# Patient Record
Sex: FEMALE | Race: WHITE | Employment: OTHER | URBAN - METROPOLITAN AREA
[De-identification: names, ages, dates, MRNs, and addresses within clinical notes are randomized per-mention and may not be internally consistent; named-entity substitution may affect disease eponyms.]

---

## 2018-07-10 PROBLEM — C50.912 HORMONE RECEPTOR POSITIVE MALIGNANT NEOPLASM OF LEFT BREAST (HCC): Status: ACTIVE | Noted: 2018-07-10

## 2018-07-10 PROBLEM — Z00.00 HEALTH CARE MAINTENANCE: Status: ACTIVE | Noted: 2018-07-10

## 2018-07-10 PROBLEM — I10 ESSENTIAL HYPERTENSION: Status: ACTIVE | Noted: 2018-07-10

## 2018-07-11 PROCEDURE — 84244 ASSAY OF RENIN: CPT | Performed by: INTERNAL MEDICINE

## 2018-07-11 PROCEDURE — 82088 ASSAY OF ALDOSTERONE: CPT | Performed by: INTERNAL MEDICINE

## 2021-02-11 PROBLEM — Z90.12 HISTORY OF LEFT MASTECTOMY: Status: ACTIVE | Noted: 2021-02-11

## 2021-05-13 PROBLEM — E78.00 PURE HYPERCHOLESTEROLEMIA: Status: ACTIVE | Noted: 2021-05-13

## 2024-11-23 ENCOUNTER — HOSPITAL ENCOUNTER (INPATIENT)
Facility: HOSPITAL | Age: 69
LOS: 2 days | Discharge: HOME | End: 2024-11-25
Attending: EMERGENCY MEDICINE | Admitting: HOSPITALIST
Payer: MEDICARE

## 2024-11-23 ENCOUNTER — APPOINTMENT (OUTPATIENT)
Dept: GENERAL RADIOLOGY | Facility: HOSPITAL | Age: 69
End: 2024-11-23
Payer: MEDICARE

## 2024-11-23 DIAGNOSIS — R07.9 ACUTE CHEST PAIN: Primary | ICD-10-CM

## 2024-11-23 LAB
ALBUMIN SERPL-MCNC: 4.5 G/DL (ref 3.2–4.8)
ALBUMIN/GLOB SERPL: 1.6 {RATIO} (ref 1–2)
ALP LIVER SERPL-CCNC: 96 U/L
ALT SERPL-CCNC: 20 U/L
ANION GAP SERPL CALC-SCNC: 5 MMOL/L (ref 0–18)
APTT PPP: 24.1 SECONDS (ref 23–36)
AST SERPL-CCNC: 19 U/L (ref ?–34)
BASOPHILS # BLD AUTO: 0.07 X10(3) UL (ref 0–0.2)
BASOPHILS NFR BLD AUTO: 0.8 %
BILIRUB SERPL-MCNC: 0.6 MG/DL (ref 0.2–1.1)
BUN BLD-MCNC: 17 MG/DL (ref 9–23)
CALCIUM BLD-MCNC: 9.9 MG/DL (ref 8.7–10.4)
CHLORIDE SERPL-SCNC: 109 MMOL/L (ref 98–112)
CO2 SERPL-SCNC: 28 MMOL/L (ref 21–32)
CREAT BLD-MCNC: 1.07 MG/DL
EGFRCR SERPLBLD CKD-EPI 2021: 57 ML/MIN/1.73M2 (ref 60–?)
EOSINOPHIL # BLD AUTO: 0.17 X10(3) UL (ref 0–0.7)
EOSINOPHIL NFR BLD AUTO: 2 %
ERYTHROCYTE [DISTWIDTH] IN BLOOD BY AUTOMATED COUNT: 14.4 %
GLOBULIN PLAS-MCNC: 2.9 G/DL (ref 2–3.5)
GLUCOSE BLD-MCNC: 109 MG/DL (ref 70–99)
HCT VFR BLD AUTO: 50 %
HGB BLD-MCNC: 16.5 G/DL
IMM GRANULOCYTES # BLD AUTO: 0.02 X10(3) UL (ref 0–1)
IMM GRANULOCYTES NFR BLD: 0.2 %
INR BLD: 0.94 (ref 0.8–1.2)
LYMPHOCYTES # BLD AUTO: 1.46 X10(3) UL (ref 1–4)
LYMPHOCYTES NFR BLD AUTO: 16.9 %
MCH RBC QN AUTO: 29.3 PG (ref 26–34)
MCHC RBC AUTO-ENTMCNC: 33 G/DL (ref 31–37)
MCV RBC AUTO: 88.7 FL
MONOCYTES # BLD AUTO: 0.64 X10(3) UL (ref 0.1–1)
MONOCYTES NFR BLD AUTO: 7.4 %
NEUTROPHILS # BLD AUTO: 6.29 X10 (3) UL (ref 1.5–7.7)
NEUTROPHILS # BLD AUTO: 6.29 X10(3) UL (ref 1.5–7.7)
NEUTROPHILS NFR BLD AUTO: 72.7 %
NT-PROBNP SERPL-MCNC: 36 PG/ML (ref ?–125)
OSMOLALITY SERPL CALC.SUM OF ELEC: 296 MOSM/KG (ref 275–295)
PLATELET # BLD AUTO: 215 10(3)UL (ref 150–450)
POTASSIUM SERPL-SCNC: 3.9 MMOL/L (ref 3.5–5.1)
PROT SERPL-MCNC: 7.4 G/DL (ref 5.7–8.2)
PROTHROMBIN TIME: 12.7 SECONDS (ref 11.6–14.8)
RBC # BLD AUTO: 5.64 X10(6)UL
SODIUM SERPL-SCNC: 142 MMOL/L (ref 136–145)
TROPONIN I SERPL HS-MCNC: 5 NG/L
TROPONIN I SERPL HS-MCNC: 6 NG/L
TROPONIN I SERPL HS-MCNC: 7 NG/L
WBC # BLD AUTO: 8.7 X10(3) UL (ref 4–11)

## 2024-11-23 PROCEDURE — 36415 COLL VENOUS BLD VENIPUNCTURE: CPT

## 2024-11-23 PROCEDURE — 99285 EMERGENCY DEPT VISIT HI MDM: CPT

## 2024-11-23 PROCEDURE — 71045 X-RAY EXAM CHEST 1 VIEW: CPT | Performed by: EMERGENCY MEDICINE

## 2024-11-23 PROCEDURE — 83880 ASSAY OF NATRIURETIC PEPTIDE: CPT | Performed by: EMERGENCY MEDICINE

## 2024-11-23 PROCEDURE — 93005 ELECTROCARDIOGRAM TRACING: CPT

## 2024-11-23 PROCEDURE — 85610 PROTHROMBIN TIME: CPT | Performed by: EMERGENCY MEDICINE

## 2024-11-23 PROCEDURE — 84484 ASSAY OF TROPONIN QUANT: CPT | Performed by: EMERGENCY MEDICINE

## 2024-11-23 PROCEDURE — 93010 ELECTROCARDIOGRAM REPORT: CPT

## 2024-11-23 PROCEDURE — 80053 COMPREHEN METABOLIC PANEL: CPT | Performed by: EMERGENCY MEDICINE

## 2024-11-23 PROCEDURE — 84484 ASSAY OF TROPONIN QUANT: CPT | Performed by: INTERNAL MEDICINE

## 2024-11-23 PROCEDURE — 85730 THROMBOPLASTIN TIME PARTIAL: CPT | Performed by: EMERGENCY MEDICINE

## 2024-11-23 PROCEDURE — 85025 COMPLETE CBC W/AUTO DIFF WBC: CPT | Performed by: EMERGENCY MEDICINE

## 2024-11-23 RX ORDER — LISINOPRIL 40 MG/1
40 TABLET ORAL DAILY
Status: DISCONTINUED | OUTPATIENT
Start: 2024-11-24 | End: 2024-11-25

## 2024-11-23 RX ORDER — SENNOSIDES 8.6 MG
17.2 TABLET ORAL NIGHTLY PRN
Status: DISCONTINUED | OUTPATIENT
Start: 2024-11-23 | End: 2024-11-25

## 2024-11-23 RX ORDER — METOPROLOL TARTRATE 50 MG
50 TABLET ORAL 2 TIMES DAILY
Status: DISCONTINUED | OUTPATIENT
Start: 2024-11-23 | End: 2024-11-25

## 2024-11-23 RX ORDER — NITROGLYCERIN 0.4 MG/1
0.4 TABLET SUBLINGUAL ONCE
Status: COMPLETED | OUTPATIENT
Start: 2024-11-23 | End: 2024-11-23

## 2024-11-23 RX ORDER — ASPIRIN 325 MG
325 TABLET ORAL DAILY
Status: DISCONTINUED | OUTPATIENT
Start: 2024-11-24 | End: 2024-11-25

## 2024-11-23 RX ORDER — ACETAMINOPHEN 500 MG
500 TABLET ORAL EVERY 4 HOURS PRN
Status: DISCONTINUED | OUTPATIENT
Start: 2024-11-23 | End: 2024-11-25

## 2024-11-23 RX ORDER — POLYETHYLENE GLYCOL 3350 17 G/17G
17 POWDER, FOR SOLUTION ORAL DAILY PRN
Status: DISCONTINUED | OUTPATIENT
Start: 2024-11-23 | End: 2024-11-25

## 2024-11-23 RX ORDER — HYDRALAZINE HYDROCHLORIDE 20 MG/ML
10 INJECTION INTRAMUSCULAR; INTRAVENOUS EVERY 6 HOURS PRN
Status: DISCONTINUED | OUTPATIENT
Start: 2024-11-23 | End: 2024-11-25

## 2024-11-23 RX ORDER — SODIUM PHOSPHATE, DIBASIC AND SODIUM PHOSPHATE, MONOBASIC 7; 19 G/230ML; G/230ML
1 ENEMA RECTAL ONCE AS NEEDED
Status: DISCONTINUED | OUTPATIENT
Start: 2024-11-23 | End: 2024-11-25

## 2024-11-23 RX ORDER — ONDANSETRON 2 MG/ML
4 INJECTION INTRAMUSCULAR; INTRAVENOUS EVERY 6 HOURS PRN
Status: DISCONTINUED | OUTPATIENT
Start: 2024-11-23 | End: 2024-11-25

## 2024-11-23 RX ORDER — BISACODYL 10 MG
10 SUPPOSITORY, RECTAL RECTAL
Status: DISCONTINUED | OUTPATIENT
Start: 2024-11-23 | End: 2024-11-25

## 2024-11-23 RX ORDER — ASPIRIN 81 MG/1
324 TABLET, CHEWABLE ORAL ONCE
Status: COMPLETED | OUTPATIENT
Start: 2024-11-23 | End: 2024-11-23

## 2024-11-23 RX ORDER — ENOXAPARIN SODIUM 100 MG/ML
40 INJECTION SUBCUTANEOUS DAILY
Status: DISCONTINUED | OUTPATIENT
Start: 2024-11-23 | End: 2024-11-25

## 2024-11-23 RX ORDER — AMLODIPINE BESYLATE 2.5 MG/1
2.5 TABLET ORAL DAILY
COMMUNITY
End: 2024-11-25

## 2024-11-23 RX ORDER — AMLODIPINE BESYLATE 2.5 MG/1
2.5 TABLET ORAL DAILY
Status: DISCONTINUED | OUTPATIENT
Start: 2024-11-23 | End: 2024-11-24

## 2024-11-23 RX ORDER — SODIUM CHLORIDE 9 MG/ML
INJECTION, SOLUTION INTRAVENOUS CONTINUOUS
Status: DISCONTINUED | OUTPATIENT
Start: 2024-11-23 | End: 2024-11-25

## 2024-11-23 RX ORDER — NITROGLYCERIN 0.4 MG/1
0.4 TABLET SUBLINGUAL EVERY 5 MIN PRN
Status: DISCONTINUED | OUTPATIENT
Start: 2024-11-23 | End: 2024-11-25

## 2024-11-23 RX ORDER — METOCLOPRAMIDE HYDROCHLORIDE 5 MG/ML
5 INJECTION INTRAMUSCULAR; INTRAVENOUS EVERY 8 HOURS PRN
Status: DISCONTINUED | OUTPATIENT
Start: 2024-11-23 | End: 2024-11-25

## 2024-11-23 NOTE — ED INITIAL ASSESSMENT (HPI)
Pt to ED with complaints of chest pain. Pt reports \"it feels like someone is squeezing it.\"   Pt denies any shortness of breath.

## 2024-11-23 NOTE — H&P
[facility]  HOSPITALIST  History and Physical     Yusra Wiley Patient Status:  Emergency    1955 MRN [id number]   [facility] EMERGENCY DEPARTMENT Attending Jacob Allred,    Hosp Day # 0 PCP Nicholas Williamson MD     Chief Complaint:   Chest pain    History of Present Illness: Yusra Wiley is a 69 year old female with PMH significant for breast CA post left mastectomy, HTN who presented to the ED with chest pain that she describes as squeezing in nature.  Says it has been off and on for the last week.  But got much worse today.  Denies any lightheadedness dizziness nausea vomiting.  Some sweating.  No abdominal pain.  Not related to exertion.  Patient's pain improved in the ED with sublingual nitroglycerin.  Chest x-ray negative.  Labs normal.  EKG without acute findings.  Troponin normal.  Cardiology was consulted and patient was admitted for further care and management.    Past Medical History:  Past Medical History:    Breast cancer (HCC)    left mastectomy with no further f/u     Cancer (HCC)    breast    Essential hypertension    High blood pressure        Past Surgical History:   Past Surgical History:   Procedure Laterality Date    Colonoscopy  10/25/2021    Mastectomy left      Other surgical history      Left breast cancer mastecomy        Social History:  reports that she has never smoked. She has never used smokeless tobacco. She reports current drug use. Drug: Cannabis. She reports that she does not drink alcohol.    Family History:   Family History   Problem Relation Age of Onset    Cancer Father         lung    Heart Disorder Mother     Hypertension Mother     Cancer Sister         breast     Breast Cancer Sister 40        dx age early 40's        Allergies: Allergies[1]    Medications:  Medications Ordered Prior to Encounter[2]    Review of Systems:   A comprehensive 14 point review of systems was completed.    Pertinent positives and negatives noted in the  HPI.    Physical Exam:    BP (!) 127/91   Pulse 55   Temp 98 °F (36.7 °C) (Oral)   Resp 18   Ht 5' 6\" (1.676 m)   Wt 170 lb (77.1 kg)   SpO2 97%   BMI 27.44 kg/m²   General: No acute distress. Alert and oriented x 3.  HEENT: Normocephalic atraumatic. Moist mucous membranes. EOM-I. PERRLA. Anicteric.  Neck: No lymphadenopathy. No JVD. No carotid bruits.  Respiratory: Clear to auscultation bilaterally. No wheezes. No rhonchi.  Cardiovascular: S1, S2. Regular rate and rhythm. No murmurs, rubs or gallops. Equal pulses.   Chest and Back: No tenderness or deformity.  Abdomen: Soft, nontender, nondistended.  Positive bowel sounds. No rebound, guarding or organomegaly.  Neurologic: No focal neurological deficits. CNII-XII grossly intact.  Musculoskeletal: Moves all extremities.  Extremities: No edema or cyanosis.  Integument: No rashes or lesions.   Psychiatric: Appropriate mood and affect.      Diagnostic Data:      Labs:  Recent Labs   Lab 11/23/24  1409 11/23/24  1423   WBC 8.7  --    HGB 16.5*  --    MCV 88.7  --    .0  --    INR  --  0.94       Recent Labs   Lab 11/23/24  1409   *   BUN 17   CREATSERUM 1.07*   CA 9.9   ALB 4.5      K 3.9      CO2 28.0   ALKPHO 96   AST 19   ALT 20   BILT 0.6   TP 7.4       Estimated Creatinine Clearance: 47.8 mL/min (A) (based on SCr of 1.07 mg/dL (H)).    Recent Labs   Lab 11/23/24  1423   PTP 12.7   INR 0.94       COVID-19 Lab Results    COVID-19  Lab Results   Component Value Date    COVID19 Not Detected 10/22/2021       Pro-Calcitonin  No results for input(s): \"PCT\" in the last 168 hours.    Cardiac  Recent Labs   Lab 11/23/24  1409   PBNP 36       Creatinine Kinase  No results for input(s): \"CK\" in the last 168 hours.    Inflammatory Markers  No results for input(s): \"CRP\", \"SARAH\", \"LDH\", \"DDIMER\" in the last 168 hours.    Recent Labs   Lab 11/23/24  1409   TROPHS 5       Imaging: Imaging data reviewed in Epic.      ASSESSMENT / PLAN:   Yusra WALKER  Inez is a 69 year old female with PMH significant for breast CA post left mastectomy, HTN who presented to the ED with chest pain that she describes as squeezing in nature.      Chest pain - concerning for angina  -tele  -trend trops  -initial trop and EKG neg  -supportive care  -cardiology consulted >> apprec recs    HTN  -resume home meds: amlodipine, metoprolol, lisinopril    Hx of breast ca, sp mastectomy  -stable, not active    Quality:  DVT Prophylaxis: Lovenox, SCDs  CODE status: Full code  Heredia: No  If COVID testing is negative, may discontinue isolation: Yes    Plan of care discussed with patient and all questions answered.        Romina Heredia MD  [facility] Hospitalist  Pager [phone]  Answering Service number: [phone]                            [1]   Allergies  Allergen Reactions    Hctz [Hydrochlorothiazide] HYPOTENSION    Opioid Analgesics NAUSEA AND VOMITING    Hydromorphone NAUSEA ONLY    Morphine NAUSEA ONLY   [2]   No current facility-administered medications on file prior to encounter.     Current Outpatient Medications on File Prior to Encounter   Medication Sig Dispense Refill    amLODIPine 2.5 MG Oral Tab Take 1 tablet (2.5 mg total) by mouth daily.      LISINOPRIL 40 MG Oral Tab TAKE 1 TABLET(40 MG) BY MOUTH DAILY 90 tablet 0    METOPROLOL TARTRATE 50 MG Oral Tab TAKE 1 TABLET(50 MG) BY MOUTH TWICE DAILY 180 tablet 0

## 2024-11-23 NOTE — ED QUICK NOTES
Pt reports significant pain improvement post medication admin.   Pt BP also improved.   ED physician made aware.   Order for repeat EKG at this time received.

## 2024-11-23 NOTE — ED PROVIDER NOTES
Patient Seen in: [facility] Emergency Department      History     Chief Complaint   Patient presents with    Chest Pain Angina     Stated Complaint: chest pain    Subjective:   HPI      This is a 69-year-old female presents emergency room for evaluation of chest pain.  Patient reports over the last week she has had intermittent chest discomfort, states it feels as if \"someone is squeezing her chest \".  Symptoms seem to occur with exertion.  Denies associated lightheaded, dizzy, nausea or diaphoresis.  Denies dyspnea.  Patient denies abdominal pain.  Denies tearing type chest or abdominal pain or any pleuritic type chest pain.  Denies DVT or PE risk factors.  Denies lower extremity swelling or calf pain denies PND orthopnea.      Objective:     Past Medical History:    Breast cancer (HCC)    left mastectomy with no further f/u     Cancer (HCC)    breast    Essential hypertension    High blood pressure              Past Surgical History:   Procedure Laterality Date    Colonoscopy  10/25/2021    Mastectomy left      Other surgical history      Left breast cancer mastecomy                 No pertinent social history.                Physical Exam     ED Triage Vitals [11/23/24 1357]   BP (!) 173/76   Pulse 55   Resp 18   Temp 98 °F (36.7 °C)   Temp src Oral   SpO2 100 %   O2 Device None (Room air)       Current Vitals:   Vital Signs  BP: (!) 127/91  Pulse: 55  Resp: 18  Temp: 98 °F (36.7 °C)  Temp src: Oral    Oxygen Therapy  SpO2: 97 %  O2 Device: None (Room air)        Physical Exam    GENERAL: Patient is awake, alert  HEENT:  no scleral icterus.  Mucous membranes are moist  HEART: Regular rate and rhythm, no murmurs.  LUNGS: Clear to auscultation bilaterally.  No Rales, no rhonchi, no wheezing, no stridor.  ABDOMEN: Soft, nondistended,non tender  EXTREMITIES: No peripheral edema, no calf tenderness,         ED Course     Labs Reviewed   CBC WITH DIFFERENTIAL WITH PLATELET - Abnormal; Notable for the following  components:       Result Value    RBC 5.64 (*)     HGB 16.5 (*)     HCT 50.0 (*)     All other components within normal limits   COMP METABOLIC PANEL (14) - Abnormal; Notable for the following components:    Glucose 109 (*)     Creatinine 1.07 (*)     Calculated Osmolality 296 (*)     eGFR-Cr 57 (*)     All other components within normal limits   TROPONIN I HIGH SENSITIVITY - Normal   PRO BETA NATRIURETIC PEPTIDE - Normal   PROTHROMBIN TIME (PT) - Normal   PTT, ACTIVATED - Normal   RAINBOW DRAW LAVENDER   RAINBOW DRAW LIGHT GREEN     EKG    Rate, intervals and axes as noted on EKG Report.  Rate: 56  Rhythm: Sinus Rhythm  Reading: Sinus bradycardia.  No ST elevation.            EKG #2    Rate, intervals and axes as noted on EKG Report.  Rate: 53  Rhythm: Sinus Rhythm  Reading: Sinus bradycardia.  No ST elevation               MDM        Differential diagnosis before testing includes but not limited to acute coronary syndrome, pulmonary edema, GERD, electrolyte abnormality, dysrhythmia, which is a medical condition that poses a threat to life/function    Past Medical History/comorbidities-hypertension, high cholesterol      Radiographic images  I personally reviewed the radiographs and my individual interpretation shows chest x-ray no pneumothorax or effusion  I also reviewed the official reports that showed chest x-ray unremarkable per radiologist    Discussion of management (consult/physicians, social work, pharmacy,ect) duly hospitalist Dr. Heredia, duly cardiology Dr Lopez    Medications Provided: Aspirin, sublingual nitroglycerin    Course of Events during Emergency Room Visit include patient present emergency room, IV established blood work obtained.  Patient initially was hypertensive, describing tightness/squeezing in her chest, was given 1 sublingual nitroglycerin, on reevaluation reports pain has resolved.  Repeat EKG performed without acute findings.  Patient did receive aspirin.  CBC and chemistry were  unremarkable.  Troponin 5 BNP 36.  Chest x-ray was unremarkable.  Patient has remained symptom-free throughout the rest of ER evaluation, patient was discussed with cardiology, will hold heparin at this time per cardiology request.  Discussed with duly hospitalist physician.  Will admit for further evaluation treatment.  Patient agrees with plan.    Shared decision making was utilized             Disposition:    Admission  I have discussed with the patient the results of test, differential diagnosis, and treatment plan. They expressed clear understanding of these instructions and agrees to the plan provided.     Note to patient: The 21st Century Cures Act makes medical notes like these available to patients in the interest of transparency. However, this is a medical document intended as peer to peer communication. It is written in medical language and may contain abbreviations or verbiage that are unfamiliar. It may appear blunt or direct. Medical documents are intended to carry relevant information, facts as evident, and the clinical opinion of the practitioner.             Medical Decision Making      Disposition and Plan     Clinical Impression:  1. Acute chest pain         Disposition:  Admit  11/23/2024  2:53 pm    Follow-up:  No follow-up provider specified.        Medications Prescribed:  Current Discharge Medication List              Supplementary Documentation:         Hospital Problems       Present on Admission  Date Reviewed: 11/23/2021            ICD-10-CM Noted POA    * (Principal) Acute chest pain R07.9 11/23/2024 Unknown

## 2024-11-23 NOTE — ED QUICK NOTES
Orders for admission, patient is aware of plan and ready to go upstairs. Any questions, please call ED RN Sena at extension [phone].     Patient Covid vaccination status: Fully vaccinated     COVID Test Ordered in ED: None    COVID Suspicion at Admission: N/A    Running Infusions:  None    Mental Status/LOC at time of transport: A&O x4    Other pertinent information:   CIWA score: N/A   NIH score:  N/A

## 2024-11-24 ENCOUNTER — APPOINTMENT (OUTPATIENT)
Dept: CV DIAGNOSTICS | Facility: HOSPITAL | Age: 69
End: 2024-11-24
Attending: INTERNAL MEDICINE
Payer: MEDICARE

## 2024-11-24 LAB
ANION GAP SERPL CALC-SCNC: 8 MMOL/L (ref 0–18)
ATRIAL RATE: 55 BPM
ATRIAL RATE: 56 BPM
BASOPHILS # BLD AUTO: 0.05 X10(3) UL (ref 0–0.2)
BASOPHILS NFR BLD AUTO: 0.7 %
BUN BLD-MCNC: 18 MG/DL (ref 9–23)
CALCIUM BLD-MCNC: 9.6 MG/DL (ref 8.7–10.4)
CHLORIDE SERPL-SCNC: 112 MMOL/L (ref 98–112)
CO2 SERPL-SCNC: 24 MMOL/L (ref 21–32)
CREAT BLD-MCNC: 0.9 MG/DL
EGFRCR SERPLBLD CKD-EPI 2021: 70 ML/MIN/1.73M2 (ref 60–?)
EOSINOPHIL # BLD AUTO: 0.27 X10(3) UL (ref 0–0.7)
EOSINOPHIL NFR BLD AUTO: 3.8 %
ERYTHROCYTE [DISTWIDTH] IN BLOOD BY AUTOMATED COUNT: 14.3 %
GLUCOSE BLD-MCNC: 88 MG/DL (ref 70–99)
HCT VFR BLD AUTO: 47.5 %
HGB BLD-MCNC: 15.7 G/DL
IMM GRANULOCYTES # BLD AUTO: 0.01 X10(3) UL (ref 0–1)
IMM GRANULOCYTES NFR BLD: 0.1 %
LYMPHOCYTES # BLD AUTO: 2.01 X10(3) UL (ref 1–4)
LYMPHOCYTES NFR BLD AUTO: 28.4 %
MCH RBC QN AUTO: 29.2 PG (ref 26–34)
MCHC RBC AUTO-ENTMCNC: 33.1 G/DL (ref 31–37)
MCV RBC AUTO: 88.5 FL
MONOCYTES # BLD AUTO: 0.43 X10(3) UL (ref 0.1–1)
MONOCYTES NFR BLD AUTO: 6.1 %
NEUTROPHILS # BLD AUTO: 4.31 X10 (3) UL (ref 1.5–7.7)
NEUTROPHILS # BLD AUTO: 4.31 X10(3) UL (ref 1.5–7.7)
NEUTROPHILS NFR BLD AUTO: 60.9 %
OSMOLALITY SERPL CALC.SUM OF ELEC: 299 MOSM/KG (ref 275–295)
P AXIS: 48 DEGREES
P AXIS: 60 DEGREES
P-R INTERVAL: 150 MS
P-R INTERVAL: 154 MS
PLATELET # BLD AUTO: 183 10(3)UL (ref 150–450)
POTASSIUM SERPL-SCNC: 4 MMOL/L (ref 3.5–5.1)
Q-T INTERVAL: 426 MS
Q-T INTERVAL: 448 MS
QRS DURATION: 80 MS
QRS DURATION: 80 MS
QTC CALCULATION (BEZET): 411 MS
QTC CALCULATION (BEZET): 428 MS
R AXIS: -29 DEGREES
R AXIS: -33 DEGREES
RBC # BLD AUTO: 5.37 X10(6)UL
SODIUM SERPL-SCNC: 144 MMOL/L (ref 136–145)
T AXIS: 22 DEGREES
T AXIS: 41 DEGREES
TROPONIN I SERPL HS-MCNC: 5 NG/L
TROPONIN I SERPL HS-MCNC: 7 NG/L
VENTRICULAR RATE: 55 BPM
VENTRICULAR RATE: 56 BPM
WBC # BLD AUTO: 7.1 X10(3) UL (ref 4–11)

## 2024-11-24 PROCEDURE — 93010 ELECTROCARDIOGRAM REPORT: CPT | Performed by: INTERNAL MEDICINE

## 2024-11-24 PROCEDURE — 93005 ELECTROCARDIOGRAM TRACING: CPT

## 2024-11-24 PROCEDURE — 85025 COMPLETE CBC W/AUTO DIFF WBC: CPT | Performed by: INTERNAL MEDICINE

## 2024-11-24 PROCEDURE — 84484 ASSAY OF TROPONIN QUANT: CPT | Performed by: INTERNAL MEDICINE

## 2024-11-24 PROCEDURE — 93306 TTE W/DOPPLER COMPLETE: CPT | Performed by: INTERNAL MEDICINE

## 2024-11-24 PROCEDURE — 80048 BASIC METABOLIC PNL TOTAL CA: CPT | Performed by: INTERNAL MEDICINE

## 2024-11-24 RX ORDER — AMLODIPINE BESYLATE 5 MG/1
5 TABLET ORAL DAILY
Status: DISCONTINUED | OUTPATIENT
Start: 2024-11-24 | End: 2024-11-24

## 2024-11-24 RX ORDER — AMLODIPINE BESYLATE 5 MG/1
5 TABLET ORAL NIGHTLY
Status: DISCONTINUED | OUTPATIENT
Start: 2024-11-24 | End: 2024-11-25

## 2024-11-24 NOTE — CONSULTS
Cardiology Consultation  [facility]    Yusra Wiley Patient Status:  Inpatient    1955 MRN [id number]   Location [facility] 8NE-A Attending Kenyatta Roland, *   Hosp Day # 1 PCP Nicholas Williamson MD     Reason for Consultation:  Chest pain     History of Present Illness:  Yusra Wiley is a a(n) 69 year old female with PMH of HTN and hx of breast cancer who is coming in for chest pain.   Having chest pain described it as squeezing pain. Every morning for the past week. Her BP in the morning have been 160-180s/90s, and then once she takes her medications. Her blood pressure improves, and so does her chest pains. No associated SOB, orthopnea, PND or BLE edema.     In ED, she was hemodynamically stable. Labs were unremarkable. Troponin x3 negative. EKG non-ischemic.     This morning, she had similar chest pains while the blood pressure was high. Now she says that it is resolved.     History:  Past Medical History:    Breast cancer (HCC)    left mastectomy with no further f/u     Cancer (HCC)    breast    Essential hypertension    High blood pressure     Past Surgical History:   Procedure Laterality Date    Colonoscopy  10/25/2021    Mastectomy left      Other surgical history      Left breast cancer mastecomy      Family History   Problem Relation Age of Onset    Cancer Father         lung    Heart Disorder Mother     Hypertension Mother     Cancer Sister         breast     Breast Cancer Sister 40        dx age early 40's      reports that she has never smoked. She has never used smokeless tobacco. She reports current drug use. Drug: Cannabis. She reports that she does not drink alcohol.    Allergies:  Allergies[1]    Medications:  Medications Ordered Prior to Encounter[2]    Review of Systems:  Constitutional: denies fevers, chills, night sweats  HEENT: denies headache, vision changes, trouble or pain with swallowing  Cardiac: denies chest pain, palpitations, edema  Pulm:  denies dyspnea, cough, wheeze  GI: denies n/v, abd pain, diarrhea or constipation  : denies hematuria, dysuria, incontinence  MSK: denies muscle or joint pains  Neuro: denies numbness, weakness, paresthesias  Psych: denies anxiety, depression  Integument: denies skin rashes or lesions  Heme: denies easy bruising or bleeding  Endo: denies heat/cold intolerance, skin or nail changes      Physical Exam:  Blood pressure (!) 162/72, pulse 59, temperature 97.9 °F (36.6 °C), temperature source Oral, resp. rate 18, height 5' 6\" (1.676 m), weight 171 lb 8.3 oz (77.8 kg), SpO2 98%, not currently breastfeeding.  Wt Readings from Last 3 Encounters:   24 171 lb 8.3 oz (77.8 kg)   21 175 lb (79.4 kg)   21 174 lb 2 oz (79 kg)       General: awake, alert, oriented x 3, no acute distress  HEENT: at/nc, perrl, eomi  Neck: No JVD, carotids 2+ no bruits.  Cardiac: Regular rate and rhythm, S1, S2 normal, no murmur, rub or gallop.  Lungs: Clear without wheezes, rales, rhonchi or dullness.  Normal excursions and effort.  Abdomen: Soft, non-tender, non-distended, normal bowel sounds   Extremities: Without clubbing, cyanosis or edema.  Peripheral pulses are 2+.  Neurologic: Alert and oriented, normal affect.  Psych: normal mood and affect  Skin: Warm and dry.       Laboratories and Data:  Diagnostics:  EK24  Sinus braducardia, left axis deviation      Labs:   CBC:    Lab Results   Component Value Date    WBC 7.1 2024    WBC 8.7 2024    WBC 9.98 2020     Lab Results   Component Value Date    HGB 15.7 2024    HGB 16.5 (H) 2024    HGB 16.2 (H) 2020      Lab Results   Component Value Date    .0 2024    .0 2024     2020     BMP:   No results found for: \"GLUCOSE\"  Lab Results   Component Value Date    K 4.0 2024    K 3.9 2024    K 4.40 2020     Lab Results   Component Value Date    BUN 18 2024    BUN 17 2024    BUN  16.0 09/01/2020     Lab Results   Component Value Date    CREATSERUM 0.90 11/24/2024    CREATSERUM 1.07 (H) 11/23/2024    CREATSERUM 0.97 (H) 09/01/2020     Cholesterol:     Lab Results   Component Value Date    CHOLEST 212.00 (H) 09/01/2020    CHOLEST 236 (H) 03/25/2020    CHOLEST 189 07/11/2018     Lab Results   Component Value Date    HDL 39 (L) 09/01/2020    HDL 46 03/25/2020    HDL 36 (L) 07/11/2018     Lab Results   Component Value Date    TRIG 178.00 (H) 09/01/2020    TRIG 102 03/25/2020    TRIG 119 07/11/2018     Lab Results   Component Value Date     (H) 09/01/2020     (H) 03/25/2020     07/11/2018     Lab Results   Component Value Date    AST 19 11/23/2024    AST 16 07/11/2018     Lab Results   Component Value Date    ALT 20 11/23/2024    ALT 27 07/11/2018       Assessment:     #Atypical chest pain  # HTN  # Hx of breast cancer     Plan:   - Troponin x3 negative  - EKG reviewed - non-ischemic   - Increase amlodipine to 5mg - take it at night  - continue lisinopril 40mg daily and metoprolol 50mg BID  - Obtain echocardiogram for further evaluation  - obtain nuclear lexiscan stress test tomorrow AM.   - Monitor telemetry    Cardiology will follow.     Ashok Lopez DO  Cardiologist, [facility]  11/24/2024  11:15 AM         [1]   Allergies  Allergen Reactions    Hctz [Hydrochlorothiazide] HYPOTENSION    Opioid Analgesics NAUSEA AND VOMITING    Hydromorphone NAUSEA ONLY    Morphine NAUSEA ONLY   [2]   No current facility-administered medications on file prior to encounter.     Current Outpatient Medications on File Prior to Encounter   Medication Sig Dispense Refill    amLODIPine 2.5 MG Oral Tab Take 1 tablet (2.5 mg total) by mouth daily.      LISINOPRIL 40 MG Oral Tab TAKE 1 TABLET(40 MG) BY MOUTH DAILY 90 tablet 0    METOPROLOL TARTRATE 50 MG Oral Tab TAKE 1 TABLET(50 MG) BY MOUTH TWICE DAILY 180 tablet 0

## 2024-11-24 NOTE — PLAN OF CARE
NURSING ADMISSION NOTE      Patient admitted via Cart  Oriented to room.  Safety precautions initiated.  Bed in low position.  Call light in reach.    Pt. Is alert and oriented times 4. NSR to sinus mehran on tele. Pt. Denies shortness of breath.   BP elevated upon arrival and pt. Complaining of 2/10 Chest pain.   1806 Cardiology notified of BP of 191/73 and 2/10 Chest pain.   1839: Cardiology updated that BP had come down to 168/87. PRN orders placed. Pt. Can have stress test diet.   1858: Hospitalist paged about IVF's and medications. Okay to not start IVF's.     Pt. Updated on plan of care. Call light within reach.   Problem: Patient/Family Goals  Goal: Patient/Family Long Term Goal  Description: Patient's Long Term Goal: stay out of hospital     Interventions:  - Follow up  - medications     - See additional Care Plan goals for specific interventions  Outcome: Progressing  Goal: Patient/Family Short Term Goal  Description: Patient's Short Term Goal: go home     Interventions:   - cards to see  - See additional Care Plan goals for specific interventions  Outcome: Progressing

## 2024-11-24 NOTE — PLAN OF CARE
Alert and oriented x4 on tele monitor hr 50's sinus mehran. Denies any chest pain. Plan of care given and verbalized understanding. All needs attended and will continue to monitor. Call light within reach.  Problem: CARDIOVASCULAR - ADULT  Goal: Maintains optimal cardiac output and hemodynamic stability  Description: INTERVENTIONS:  - Monitor vital signs, rhythm, and trends  - Monitor for bleeding, hypotension and signs of decreased cardiac output  - Evaluate effectiveness of vasoactive medications to optimize hemodynamic stability  - Monitor arterial and/or venous puncture sites for bleeding and/or hematoma  - Assess quality of pulses, skin color and temperature  - Assess for signs of decreased coronary artery perfusion - ex. Angina  - Evaluate fluid balance, assess for edema, trend weights  11/23/2024 2054 by Sally Dailey, ROSARIO  Outcome: Progressing  11/23/2024 2053 by Sally Dailey, ROSARIO  Outcome: Progressing     Problem: CARDIOVASCULAR - ADULT  Goal: Absence of cardiac arrhythmias or at baseline  Description: INTERVENTIONS:  - Continuous cardiac monitoring, monitor vital signs, obtain 12 lead EKG if indicated  - Evaluate effectiveness of antiarrhythmic and heart rate control medications as ordered  - Initiate emergency measures for life threatening arrhythmias  - Monitor electrolytes and administer replacement therapy as ordered  11/23/2024 2054 by Sally Dailey, RN  Outcome: Progressing  11/23/2024 2053 by Sally Dailey, ROSARIO  Outcome: Progressing

## 2024-11-24 NOTE — PROGRESS NOTES
[facility]   part of [facility] Hospitalist Progress Note     Yusra ManuelKumarJaime Patient Status:  Inpatient    1955 MRN [id number]   Location [facility] 8NE-A Attending Kenyatta Roland, *   Hosp Day # 1 PCP Nicholas Williamson MD     Chief Complaint:   Follow up - Chest pain    Subjective:     Patient seen and examined.   No chest pain this am  Trops neg  EKG neg    Objective:    Review of Systems:   10 point ROS completed and was negative, except for pertinent positive and negatives stated in subjective.    Vital signs:  Temp:  [97.8 °F (36.6 °C)-98.8 °F (37.1 °C)] 98.5 °F (36.9 °C)  Pulse:  [55-61] 55  Resp:  [18] 18  BP: (127-173)/(69-91) 148/82  SpO2:  [95 %-100 %] 97 %    Physical Exam:    General: No acute distress.   HEENT:  EOMI, PERRLA, OP clear  Respiratory: Clear to auscultation bilaterally. No wheezes. No rhonchi.  Cardiovascular: S1, S2. Regular rate and rhythm. No murmurs.  Abdomen: Soft, nontender, nondistended.  Positive bowel sounds. No rebound or guarding.  Extremities: No edema.  Neuro:  Grossly non focal, no motor deficits.        Diagnostic Data:    Labs:  Recent Labs   Lab 24  1409 24  1423 24  0546   WBC 8.7  --  7.1   HGB 16.5*  --  15.7   MCV 88.7  --  88.5   .0  --  183.0   INR  --  0.94  --        Recent Labs   Lab 24  1409 24  0546   * 88   BUN 17 18   CREATSERUM 1.07* 0.90   CA 9.9 9.6   ALB 4.5  --     144   K 3.9 4.0    112   CO2 28.0 24.0   ALKPHO 96  --    AST 19  --    ALT 20  --    BILT 0.6  --    TP 7.4  --        Estimated Creatinine Clearance: 56.8 mL/min (based on SCr of 0.9 mg/dL).    Recent Labs   Lab 11/23/24  1423   PTP 12.7   INR 0.94            COVID-19 Lab Results    COVID-19  Lab Results   Component Value Date    COVID19 Not Detected 10/22/2021       Pro-Calcitonin  No results for input(s): \"PCT\" in the last 168 hours.    Cardiac  Recent Labs   Lab  11/23/24  1409   PBNP 36       Creatinine Kinase  No results for input(s): \"CK\" in the last 168 hours.    Inflammatory Markers  No results for input(s): \"CRP\", \"SARAH\", \"LDH\", \"DDIMER\" in the last 168 hours.    Imaging: Imaging data reviewed in Epic.    Medications:    enoxaparin  40 mg Subcutaneous Daily    aspirin  325 mg Oral Daily    amLODIPine  2.5 mg Oral Daily    lisinopril  40 mg Oral Daily    metoprolol tartrate  50 mg Oral BID       Assessment & Plan:    Yusra Wiley is a 69 year old female with PMH significant for breast CA post left mastectomy, HTN who presented to the ED with chest pain that she describes as squeezing in nature.       Chest pain - concerning for angina  -tele  -trend trops - all neg overnight  -initial trop and EKG neg  -supportive care  -cardiology consulted >> apprec recs     HTN  -resume home meds: amlodipine, metoprolol, lisinopril     Hx of breast ca, sp mastectomy  -stable, not active     Quality:  DVT Prophylaxis: Lovenox, SCDs  CODE status: Full code  Heredia: No  If COVID testing is negative, may discontinue isolation: Yes     Plan of care discussed with patient and all questions answered.           Romina Heredia MD  [facility] Hospitalist  Pager [phone]  Answering Service number: [phone]            **Certification      PHYSICIAN Certification of Need for Inpatient Hospitalization - Initial Certification    Patient will require inpatient services that will reasonably be expected to span two midnight's based on the clinical documentation in H+P.   Based on patients current state of illness, I anticipate that, after discharge, patient will require TBD.

## 2024-11-25 ENCOUNTER — APPOINTMENT (OUTPATIENT)
Dept: CV DIAGNOSTICS | Facility: HOSPITAL | Age: 69
End: 2024-11-25
Attending: INTERNAL MEDICINE
Payer: MEDICARE

## 2024-11-25 VITALS
HEART RATE: 73 BPM | TEMPERATURE: 98 F | SYSTOLIC BLOOD PRESSURE: 159 MMHG | WEIGHT: 171.5 LBS | BODY MASS INDEX: 27.56 KG/M2 | DIASTOLIC BLOOD PRESSURE: 77 MMHG | RESPIRATION RATE: 20 BRPM | HEIGHT: 66 IN | OXYGEN SATURATION: 100 %

## 2024-11-25 PROCEDURE — 93017 CV STRESS TEST TRACING ONLY: CPT | Performed by: INTERNAL MEDICINE

## 2024-11-25 PROCEDURE — 78452 HT MUSCLE IMAGE SPECT MULT: CPT | Performed by: INTERNAL MEDICINE

## 2024-11-25 PROCEDURE — 93018 CV STRESS TEST I&R ONLY: CPT | Performed by: INTERNAL MEDICINE

## 2024-11-25 RX ORDER — ALPRAZOLAM 0.25 MG/1
0.25 TABLET ORAL ONCE
Status: COMPLETED | OUTPATIENT
Start: 2024-11-25 | End: 2024-11-25

## 2024-11-25 RX ORDER — AMLODIPINE BESYLATE 5 MG/1
5 TABLET ORAL NIGHTLY
Qty: 90 TABLET | Refills: 1 | Status: SHIPPED | OUTPATIENT
Start: 2024-11-25 | End: 2024-11-25

## 2024-11-25 RX ORDER — AMLODIPINE BESYLATE 5 MG/1
5 TABLET ORAL 2 TIMES DAILY
Qty: 90 TABLET | Refills: 1 | Status: SHIPPED | OUTPATIENT
Start: 2024-11-25

## 2024-11-25 RX ORDER — AMLODIPINE BESYLATE 5 MG/1
5 TABLET ORAL 2 TIMES DAILY
Status: CANCELLED | OUTPATIENT
Start: 2024-11-25

## 2024-11-25 RX ORDER — ALPRAZOLAM 0.25 MG/1
0.25 TABLET ORAL NIGHTLY PRN
Status: DISCONTINUED | OUTPATIENT
Start: 2024-11-25 | End: 2024-11-25

## 2024-11-25 RX ORDER — REGADENOSON 0.08 MG/ML
INJECTION, SOLUTION INTRAVENOUS
Status: COMPLETED
Start: 2024-11-25 | End: 2024-11-25

## 2024-11-25 NOTE — PLAN OF CARE
Remains in SB-NSR.  BPs elevated this morning and some slight chest pressure started occurring.  After taking her bp medications her pressure slowly decreased and went away.  She says this occurred prior to coming in.  This early evening I found her bp elvated in the 170s and gave PRN hydralazine.  I checked on her 40 minutes later and she felt heaviness in her eyelids, head, and felt some chest pressure.  Cardiology aware see other note.  Now she is feeling much better as time goes on.  She will be declining PRN hydralazine in the future.  Lungs clear on RA.  Negative for edema.  Abdomen soft and non tender to touch with active bowel sounds in all four quadrants.  POC: No caffiene, hold BB in the am for stress test.  Still awaiting 2D echo results.  All needs met by staff.        Problem: Patient/Family Goals  Goal: Patient/Family Long Term Goal  Description: Patient's Long Term Goal:     - See additional Care Plan goals for specific interventions  Outcome: Progressing  Goal: Patient/Family Short Term Goal  Description: Patient's Short Term Goal:     Interventions:     - See additional Care Plan goals for specific interventions  Outcome: Progressing     Problem: CARDIOVASCULAR - ADULT  Goal: Absence of cardiac arrhythmias or at baseline  Description: INTERVENTIONS:  - Continuous cardiac monitoring, monitor vital signs, obtain 12 lead EKG if indicated  - Evaluate effectiveness of antiarrhythmic and heart rate control medications as ordered  - Initiate emergency measures for life threatening arrhythmias  - Monitor electrolytes and administer replacement therapy as ordered  Outcome: Progressing     Problem: CARDIOVASCULAR - ADULT  Goal: Maintains optimal cardiac output and hemodynamic stability  Description: INTERVENTIONS:  - Monitor vital signs, rhythm, and trends  - Monitor for bleeding, hypotension and signs of decreased cardiac output  - Evaluate effectiveness of vasoactive medications to optimize hemodynamic  stability  - Monitor arterial and/or venous puncture sites for bleeding and/or hematoma  - Assess quality of pulses, skin color and temperature  - Assess for signs of decreased coronary artery perfusion - ex. Angina  - Evaluate fluid balance, assess for edema, trend weights  Outcome: Not Progressing

## 2024-11-25 NOTE — PROGRESS NOTES
Cardiology Consultation  [facility]    Yusra Wiley Patient Status:  Inpatient    1955 MRN [id number]   Location [facility] 8NE-A Attending Kenyatta Roland, *   Hosp Day # 2 PCP Nicholas Williamson MD     Reason for Consultation:  Chest pain     History of Present Illness:  Yusra Wiley is a a(n) 69 year old female with PMH of HTN and hx of breast cancer who is coming in for chest pain.   Having chest pain described it as squeezing pain. Every morning for the past week. Her BP in the morning have been 160-180s/90s, and then once she takes her medications. Her blood pressure improves, and so does her chest pains. No associated SOB, orthopnea, PND or BLE edema.     In ED, she was hemodynamically stable. Labs were unremarkable. Troponin x3 negative. EKG non-ischemic.     History:  Past Medical History:    Breast cancer (HCC)    left mastectomy with no further f/u     Cancer (HCC)    breast    Essential hypertension    High blood pressure     Past Surgical History:   Procedure Laterality Date    Colonoscopy  10/25/2021    Mastectomy left      Other surgical history      Left breast cancer mastecomy      Family History   Problem Relation Age of Onset    Cancer Father         lung    Heart Disorder Mother     Hypertension Mother     Cancer Sister         breast     Breast Cancer Sister 40        dx age early 40's      reports that she has never smoked. She has never used smokeless tobacco. She reports current drug use. Drug: Cannabis. She reports that she does not drink alcohol.    Allergies:  Allergies[1]    Medications:  Medications Ordered Prior to Encounter[2]    Review of Systems:  Constitutional: denies fevers, chills, night sweats  HEENT: denies headache, vision changes, trouble or pain with swallowing  Cardiac: denies chest pain, palpitations, edema  Pulm: denies dyspnea, cough, wheeze  GI: denies n/v, abd pain, diarrhea or constipation  : denies hematuria, dysuria,  incontinence  MSK: denies muscle or joint pains  Neuro: denies numbness, weakness, paresthesias  Psych: denies anxiety, depression  Integument: denies skin rashes or lesions  Heme: denies easy bruising or bleeding  Endo: denies heat/cold intolerance, skin or nail changes      Physical Exam:  Blood pressure 159/77, pulse 73, temperature 97.6 °F (36.4 °C), temperature source Oral, resp. rate 20, height 5' 6\" (1.676 m), weight 171 lb 8.3 oz (77.8 kg), SpO2 100%, not currently breastfeeding.  Wt Readings from Last 3 Encounters:   24 171 lb 8.3 oz (77.8 kg)   21 175 lb (79.4 kg)   21 174 lb 2 oz (79 kg)       General: awake, alert, oriented x 3, no acute distress  HEENT: at/nc, perrl, eomi  Neck: No JVD, carotids 2+ no bruits.  Cardiac: Regular rate and rhythm, S1, S2 normal, no murmur, rub or gallop.  Lungs: Clear without wheezes, rales, rhonchi or dullness.  Normal excursions and effort.  Abdomen: Soft, non-tender, non-distended, normal bowel sounds   Extremities: Without clubbing, cyanosis or edema.  Peripheral pulses are 2+.  Neurologic: Alert and oriented, normal affect.  Psych: normal mood and affect  Skin: Warm and dry.       Laboratories and Data:  Diagnostics:  EK24  Sinus braducardia, left axis deviation      Labs:   CBC:    Lab Results   Component Value Date    WBC 7.1 2024    WBC 8.7 2024    WBC 9.98 2020     Lab Results   Component Value Date    HGB 15.7 2024    HGB 16.5 (H) 2024    HGB 16.2 (H) 2020      Lab Results   Component Value Date    .0 2024    .0 2024     2020     BMP:   No results found for: \"GLUCOSE\"  Lab Results   Component Value Date    K 4.0 2024    K 3.9 2024    K 4.40 2020     Lab Results   Component Value Date    BUN 18 2024    BUN 17 2024    BUN 16.0 2020     Lab Results   Component Value Date    CREATSERUM 0.90 2024    CREATSERUM 1.07 (H)  11/23/2024    CREATSERUM 0.97 (H) 09/01/2020     Cholesterol:     Lab Results   Component Value Date    CHOLEST 212.00 (H) 09/01/2020    CHOLEST 236 (H) 03/25/2020    CHOLEST 189 07/11/2018     Lab Results   Component Value Date    HDL 39 (L) 09/01/2020    HDL 46 03/25/2020    HDL 36 (L) 07/11/2018     Lab Results   Component Value Date    TRIG 178.00 (H) 09/01/2020    TRIG 102 03/25/2020    TRIG 119 07/11/2018     Lab Results   Component Value Date     (H) 09/01/2020     (H) 03/25/2020     07/11/2018     Lab Results   Component Value Date    AST 19 11/23/2024    AST 16 07/11/2018     Lab Results   Component Value Date    ALT 20 11/23/2024    ALT 27 07/11/2018       Assessment:     #Atypical chest pain  # HTN  # Hx of breast cancer     Plan:   - Troponin x3 negative  - EKG reviewed - non-ischemic   - Increase amlodipine to 5mg BID as blood pressure remain elevated  - continue lisinopril 40mg daily and metoprolol 50mg BID  - ECHO no acute abnormalities noted.   - Stress test normal: EF 68% with no perfusion or wall motion abnormalities noted.   - Ok for discharge: has follow up with primary cardiologist scheduled for tomorrow to better address plan for BP management.     DWAYNE Espinoza, 11/25/24, 2:27 PM             [1]   Allergies  Allergen Reactions    Hctz [Hydrochlorothiazide] HYPOTENSION    Opioid Analgesics NAUSEA AND VOMITING    Hydromorphone NAUSEA ONLY    Morphine NAUSEA ONLY   [2]   No current facility-administered medications on file prior to encounter.     Current Outpatient Medications on File Prior to Encounter   Medication Sig Dispense Refill    amLODIPine 2.5 MG Oral Tab Take 1 tablet (2.5 mg total) by mouth daily.      LISINOPRIL 40 MG Oral Tab TAKE 1 TABLET(40 MG) BY MOUTH DAILY 90 tablet 0    METOPROLOL TARTRATE 50 MG Oral Tab TAKE 1 TABLET(50 MG) BY MOUTH TWICE DAILY 180 tablet 0

## 2024-11-25 NOTE — PLAN OF CARE
Rec'd pt at 0730. A&O x 4, anxious, given xanax this AM. Tele shows NSR/SB. O2 sats adequate on RA. Pt continent, up ad lai. No C/O pain or SOB. Skin dry and intact. Bed locked and in low position, call light and personal items within reach. Will continue to monitor. POC - Lexiscan stress test today, monitor BP.     Problem: CARDIOVASCULAR - ADULT  Goal: Maintains optimal cardiac output and hemodynamic stability  Description: INTERVENTIONS:  - Monitor vital signs, rhythm, and trends  - Monitor for bleeding, hypotension and signs of decreased cardiac output  - Evaluate effectiveness of vasoactive medications to optimize hemodynamic stability  - Monitor arterial and/or venous puncture sites for bleeding and/or hematoma  - Assess quality of pulses, skin color and temperature  - Assess for signs of decreased coronary artery perfusion - ex. Angina  - Evaluate fluid balance, assess for edema, trend weights  Outcome: Progressing  Goal: Absence of cardiac arrhythmias or at baseline  Description: INTERVENTIONS:  - Continuous cardiac monitoring, monitor vital signs, obtain 12 lead EKG if indicated  - Evaluate effectiveness of antiarrhythmic and heart rate control medications as ordered  - Initiate emergency measures for life threatening arrhythmias  - Monitor electrolytes and administer replacement therapy as ordered  Outcome: Progressing     Problem: PAIN - ADULT  Goal: Verbalizes/displays adequate comfort level or patient's stated pain goal  Description: INTERVENTIONS:  - Encourage pt to monitor pain and request assistance  - Assess pain using appropriate pain scale  - Administer analgesics based on type and severity of pain and evaluate response  - Implement non-pharmacological measures as appropriate and evaluate response  - Consider cultural and social influences on pain and pain management  - Manage/alleviate anxiety  - Utilize distraction and/or relaxation techniques  - Monitor for opioid side effects  - Notify MD/LIP  if interventions unsuccessful or patient reports new pain  - Anticipate increased pain with activity and pre-medicate as appropriate  Outcome: Progressing     Problem: RISK FOR INFECTION - ADULT  Goal: Absence of fever/infection during anticipated neutropenic period  Description: INTERVENTIONS  - Monitor WBC  - Administer growth factors as ordered  - Implement neutropenic guidelines  Outcome: Progressing     Problem: SAFETY ADULT - FALL  Goal: Free from fall injury  Description: INTERVENTIONS:  - Assess pt frequently for physical needs  - Identify cognitive and physical deficits and behaviors that affect risk of falls.  - [place] fall precautions as indicated by assessment.  - Educate pt/family on patient safety including physical limitations  - Instruct pt to call for assistance with activity based on assessment  - Modify environment to reduce risk of injury  - Provide assistive devices as appropriate  - Consider OT/PT consult to assist with strengthening/mobility  - Encourage toileting schedule  Outcome: Progressing

## 2024-11-25 NOTE — PROGRESS NOTES
[facility]   part of [facility] Hospitalist Progress Note     Yusra ManuelKumarJaime Patient Status:  Inpatient    1955 MRN [id number]   Location [facility] 8NE-A Attending Kenyatta Roland, *   Hosp Day # 2 PCP Nicholas Williamson MD     Chief Complaint:   Follow up - Chest pain    Subjective:     Patient seen and examined.   No chest pain this am  Trops neg  EKG neg  ECHO and stress results pending    Objective:    Review of Systems:   10 point ROS completed and was negative, except for pertinent positive and negatives stated in subjective.    Vital signs:  Temp:  [97.7 °F (36.5 °C)-98.7 °F (37.1 °C)] 97.7 °F (36.5 °C)  Pulse:  [53-71] 59  Resp:  [18-20] 18  BP: (140-175)/(65-76) 140/76  SpO2:  [96 %-100 %] 100 %    Physical Exam:    General: No acute distress.   HEENT:  EOMI, PERRLA, OP clear  Respiratory: Clear to auscultation bilaterally. No wheezes. No rhonchi.  Cardiovascular: S1, S2. Regular rate and rhythm. No murmurs.  Abdomen: Soft, nontender, nondistended.  Positive bowel sounds. No rebound or guarding.  Extremities: No edema.  Neuro:  Grossly non focal, no motor deficits.        Diagnostic Data:    Labs:  Recent Labs   Lab 24  1409 24  1423 24  0546   WBC 8.7  --  7.1   HGB 16.5*  --  15.7   MCV 88.7  --  88.5   .0  --  183.0   INR  --  0.94  --        Recent Labs   Lab 24  1409 24  0546   * 88   BUN 17 18   CREATSERUM 1.07* 0.90   CA 9.9 9.6   ALB 4.5  --     144   K 3.9 4.0    112   CO2 28.0 24.0   ALKPHO 96  --    AST 19  --    ALT 20  --    BILT 0.6  --    TP 7.4  --        Estimated Creatinine Clearance: 56.8 mL/min (based on SCr of 0.9 mg/dL).    Recent Labs   Lab 24  1423   PTP 12.7   INR 0.94            COVID-19 Lab Results    COVID-19  Lab Results   Component Value Date    COVID19 Not Detected 10/22/2021       Pro-Calcitonin  No results for input(s): \"PCT\" in the last 168  hours.    Cardiac  Recent Labs   Lab 11/23/24  1409   PBNP 36       Creatinine Kinase  No results for input(s): \"CK\" in the last 168 hours.    Inflammatory Markers  No results for input(s): \"CRP\", \"SARAH\", \"LDH\", \"DDIMER\" in the last 168 hours.    Imaging: Imaging data reviewed in Epic.    Medications:    amLODIPine  5 mg Oral Nightly    enoxaparin  40 mg Subcutaneous Daily    aspirin  325 mg Oral Daily    lisinopril  40 mg Oral Daily    metoprolol tartrate  50 mg Oral BID       Assessment & Plan:    Yusra Wiley is a 69 year old female with PMH significant for breast CA post left mastectomy, HTN who presented to the ED with chest pain that she describes as squeezing in nature.       Chest pain - concerning for angina  -tele  -trend trops - all neg overnight  -initial trop and EKG neg  -supportive care  -cardiology consulted >> apprec recs  -ECHO results pending  -Stress this am     HTN  -resume home meds: amlodipine, metoprolol, lisinopril     Hx of breast ca, sp mastectomy  -stable, not active     Quality:  DVT Prophylaxis: Lovenox, SCDs  CODE status: Full code  Heredia: No  If COVID testing is negative, may discontinue isolation: Yes     DISPO:  If cardiac workup neg - and cleared by cards   Pt can likely be discharged home today  Await stress test results and echo  Plan of care discussed with patient and all questions answered.           Romina Heredia MD  [facility] Hospitalist  Pager [phone]  Answering Service number: [phone]            **Certification      PHYSICIAN Certification of Need for Inpatient Hospitalization - Initial Certification    Patient will require inpatient services that will reasonably be expected to span two midnight's based on the clinical documentation in H+P.   Based on patients current state of illness, I anticipate that, after discharge, patient will require TBD.

## 2024-11-25 NOTE — PROGRESS NOTES
11/24/24 1800   Provider Notification   Reason for Communication Change in status   Provider Name Ashok Lopez Candice Larson DO   Method of Communication Secure Messaging   Response See orders   Notification Time 1809   Shift Event Other  (Chest pressure after PRN hydralazine)       Informed cardiology that patient felt unwell after her PRN IV hydralazine dose after her bp was in the 170s.  Her bp came down to the 150s, but she felt 5/10 chest pressure, heaviness in her head and eyes.  No rash noted.  Ordered a STAT troponin and an EKG.  EKG appears unchanged.  Let Cardiology know.  Pt. Will not take PRN hydralazine now.

## 2024-11-25 NOTE — PLAN OF CARE
Patient alert and oriented x 4 with anxious , On room air. Sr/sb on cardiac monitor. Bp elevated, meds administered , Echo result pending, stress test in am ,worried about to staying , waiting for discharge after stress test ,Patient denies any chest pain or chest discomfort at this time. Patient voids, last BM 11/22. . Plan of care updated, all questions answered. Safety precautions in place. Bed alarm on. Will continue to monitor tele/labs/vital signs closely.     Plan:   Treadmill diet  Stress test     Problem: CARDIOVASCULAR - ADULT  Goal: Maintains optimal cardiac output and hemodynamic stability  Description: INTERVENTIONS:  - Monitor vital signs, rhythm, and trends  - Monitor for bleeding, hypotension and signs of decreased cardiac output  - Evaluate effectiveness of vasoactive medications to optimize hemodynamic stability  - Monitor arterial and/or venous puncture sites for bleeding and/or hematoma  - Assess quality of pulses, skin color and temperature  - Assess for signs of decreased coronary artery perfusion - ex. Angina  - Evaluate fluid balance, assess for edema, trend weights  Outcome: Progressing     Problem: CARDIOVASCULAR - ADULT  Goal: Absence of cardiac arrhythmias or at baseline  Description: INTERVENTIONS:  - Continuous cardiac monitoring, monitor vital signs, obtain 12 lead EKG if indicated  - Evaluate effectiveness of antiarrhythmic and heart rate control medications as ordered  - Initiate emergency measures for life threatening arrhythmias  - Monitor electrolytes and administer replacement therapy as ordered  Outcome: Progressing     Problem: PAIN - ADULT  Goal: Verbalizes/displays adequate comfort level or patient's stated pain goal  Description: INTERVENTIONS:  - Encourage pt to monitor pain and request assistance  - Assess pain using appropriate pain scale  - Administer analgesics based on type and severity of pain and evaluate response  - Implement non-pharmacological measures as  appropriate and evaluate response  - Consider cultural and social influences on pain and pain management  - Manage/alleviate anxiety  - Utilize distraction and/or relaxation techniques  - Monitor for opioid side effects  - Notify MD/LIP if interventions unsuccessful or patient reports new pain  - Anticipate increased pain with activity and pre-medicate as appropriate  Outcome: Progressing     Problem: RISK FOR INFECTION - ADULT  Goal: Absence of fever/infection during anticipated neutropenic period  Description: INTERVENTIONS  - Monitor WBC  - Administer growth factors as ordered  - Implement neutropenic guidelines  Outcome: Progressing     Problem: DISCHARGE PLANNING  Goal: Discharge to home or other facility with appropriate resources  Description: INTERVENTIONS:  - Identify barriers to discharge w/pt and caregiver  - Include patient/family/discharge partner in discharge planning  - Arrange for needed discharge resources and transportation as appropriate  - Identify discharge learning needs (meds, wound care, etc)  - Arrange for interpreters to assist at discharge as needed  - Consider post-discharge preferences of patient/family/discharge partner  - Complete POLST form as appropriate  - Assess patient's ability to be responsible for managing their own health  - Refer to Case Management Department for coordinating discharge planning if the patient needs post-hospital services based on physician/LIP order or complex needs related to functional status, cognitive ability or social support system  Outcome: Progressing     Problem: SAFETY ADULT - FALL  Goal: Free from fall injury  Description: INTERVENTIONS:  - Assess pt frequently for physical needs  - Identify cognitive and physical deficits and behaviors that affect risk of falls.  - [place] fall precautions as indicated by assessment.  - Educate pt/family on patient safety including physical limitations  - Instruct pt to call for assistance with activity based on  assessment  - Modify environment to reduce risk of injury  - Provide assistive devices as appropriate  - Consider OT/PT consult to assist with strengthening/mobility  - Encourage toileting schedule  Outcome: Progressing

## 2024-11-26 NOTE — PAYOR COMM NOTE
--------------  ADMISSION REVIEW     [place] KYLIE [place] [facility] [place]:  [id number]  Authorization Number: [id number]    Admit date: 11/23/24  Admit time:  5:55 PM       REVIEW DOCUMENTATION:     ED Provider Notes        ED Provider Notes signed by Jacob Allred DO at 11/23/2024  4:00 PM       Author: Jacob Allred DO Service: -- Author Type: Physician    Filed: 11/23/2024  4:00 PM Date of Service: 11/23/2024  2:18 PM Status: Signed    : Jacob Allred DO (Physician)           Patient Seen in: [facility] Emergency Department      History     Chief Complaint   Patient presents with    Chest Pain Angina     Stated Complaint: chest pain    Subjective:   HPI      This is a 69-year-old female presents emergency room for evaluation of chest pain.  Patient reports over the last week she has had intermittent chest discomfort, states it feels as if \"someone is squeezing her chest \".  Symptoms seem to occur with exertion.  Denies associated lightheaded, dizzy, nausea or diaphoresis.  Denies dyspnea.  Patient denies abdominal pain.  Denies tearing type chest or abdominal pain or any pleuritic type chest pain.  Denies DVT or PE risk factors.  Denies lower extremity swelling or calf pain denies PND orthopnea.      Objective:     Past Medical History:    Breast cancer (HCC)    left mastectomy with no further f/u     Cancer (HCC)    breast    Essential hypertension    High blood pressure              Past Surgical History:   Procedure Laterality Date    Colonoscopy  10/25/2021    Mastectomy left      Other surgical history      Left breast cancer mastecomy                 No pertinent social history.                Physical Exam     ED Triage Vitals [11/23/24 1357]   BP (!) 173/76   Pulse 55   Resp 18   Temp 98 °F (36.7 °C)   Temp src Oral   SpO2 100 %   O2 Device None (Room air)       Current Vitals:   Vital Signs  BP: (!) 127/91  Pulse: 55  Resp: 18  Temp: 98 °F (36.7 °C)  Temp src: Oral    Oxygen Therapy  SpO2: 97 %  O2  Device: None (Room air)        Physical Exam    GENERAL: Patient is awake, alert  HEENT:  no scleral icterus.  Mucous membranes are moist  HEART: Regular rate and rhythm, no murmurs.  LUNGS: Clear to auscultation bilaterally.  No Rales, no rhonchi, no wheezing, no stridor.  ABDOMEN: Soft, nondistended,non tender  EXTREMITIES: No peripheral edema, no calf tenderness,         ED Course     Labs Reviewed   CBC WITH DIFFERENTIAL WITH PLATELET - Abnormal; Notable for the following components:       Result Value    RBC 5.64 (*)     HGB 16.5 (*)     HCT 50.0 (*)     All other components within normal limits   COMP METABOLIC PANEL (14) - Abnormal; Notable for the following components:    Glucose 109 (*)     Creatinine 1.07 (*)     Calculated Osmolality 296 (*)     eGFR-Cr 57 (*)     All other components within normal limits   TROPONIN I HIGH SENSITIVITY - Normal   PRO BETA NATRIURETIC PEPTIDE - Normal   PROTHROMBIN TIME (PT) - Normal   PTT, ACTIVATED - Normal   RAINBOW DRAW LAVENDER   RAINBOW DRAW LIGHT GREEN     EKG    Rate, intervals and axes as noted on EKG Report.  Rate: 56  Rhythm: Sinus Rhythm  Reading: Sinus bradycardia.  No ST elevation.            EKG #2    Rate, intervals and axes as noted on EKG Report.  Rate: 53  Rhythm: Sinus Rhythm  Reading: Sinus bradycardia.  No ST elevation              MDM        Differential diagnosis before testing includes but not limited to acute coronary syndrome, pulmonary edema, GERD, electrolyte abnormality, dysrhythmia, which is a medical condition that poses a threat to life/function    Past Medical History/comorbidities-hypertension, high cholesterol      Radiographic images  I personally reviewed the radiographs and my individual interpretation shows chest x-ray no pneumothorax or effusion  I also reviewed the official reports that showed chest x-ray unremarkable per radiologist    Discussion of management (consult/physicians, social work, pharmacy,ect) duly hospitalist   hernan Heredia cardiology Dr Lopez    Medications Provided: Aspirin, sublingual nitroglycerin    Course of Events during Emergency Room Visit include patient present emergency room, IV established blood work obtained.  Patient initially was hypertensive, describing tightness/squeezing in her chest, was given 1 sublingual nitroglycerin, on reevaluation reports pain has resolved.  Repeat EKG performed without acute findings.  Patient did receive aspirin.  CBC and chemistry were unremarkable.  Troponin 5 BNP 36.  Chest x-ray was unremarkable.  Patient has remained symptom-free throughout the rest of ER evaluation, patient was discussed with cardiology, will hold heparin at this time per cardiology request.  Discussed with [facility] hospitalist physician.  Will admit for further evaluation treatment.  Patient agrees with plan.    Shared decision making was utilized             Disposition:    Admission  I have discussed with the patient the results of test, differential diagnosis, and treatment plan. They expressed clear understanding of these instructions and agrees to the plan provided.     Note to patient: The 21st Century Cures Act makes medical notes like these available to patients in the interest of transparency. However, this is a medical document intended as peer to peer communication. It is written in medical language and may contain abbreviations or verbiage that are unfamiliar. It may appear blunt or direct. Medical documents are intended to carry relevant information, facts as evident, and the clinical opinion of the practitioner.             Medical Decision Making      Disposition and Plan     Clinical Impression:  1. Acute chest pain         Disposition:  Admit  11/23/2024  2:53 pm    Follow-up:  No follow-up provider specified.        Medications Prescribed:  Current Discharge Medication List              Supplementary Documentation:         Hospital Problems       Present on Admission  Date Reviewed:  11/23/2021            ICD-10-CM Noted POA    * (Principal) Acute chest pain R07.9 11/23/2024 Unknown                                                          Signed by Jacob Allred DO on 11/23/2024  4:00 PM         MEDICATIONS ADMINISTERED IN LAST 1 DAY:  acetaminophen (Tylenol Extra Strength) tab 500 mg       Date Action Dose Route User    Discharged on 11/25/2024 11/25/2024 1241 Given 500 mg Oral Milton Jung, RN            Vitals (last day) before discharge       Date/Time Temp Pulse Resp BP SpO2 Weight O2 Device O2 Flow Rate (L/min) [facility]    11/25/24 1407 -- 73 -- 159/77 -- -- -- -- KG    11/25/24 1143 -- 60 -- 155/77 -- -- -- -- KG    11/25/24 1142 97.6 °F (36.4 °C) 60 20 177/83 100 % -- None (Room air) --     11/25/24 0833 -- 59 18 140/76 100 % -- None (Room air) -- KG    11/25/24 0806 97.7 °F (36.5 °C) 71 20 175/67 96 % -- None (Room air) --     11/25/24 0502 98.1 °F (36.7 °C) 60 20 154/75 97 % -- None (Room air) -- SC    11/25/24 0000 98.4 °F (36.9 °C) 58 18 146/71 100 % -- None (Room air) -- SC    11/24/24 2229 -- 64 18 145/71 99 % -- -- -- SC    11/24/24 2010 98.7 °F (37.1 °C) 65 18 157/68 98 % -- None (Room air) --     11/24/24 1558 97.8 °F (36.6 °C) 53 18 172/65 99 % -- None (Room air) --     11/24/24 1243 97.8 °F (36.6 °C) 54 18 173/70 97 % -- None (Room air) --     11/24/24 0831 97.9 °F (36.6 °C) 59 18 162/72 98 % -- None (Room air) --     11/24/24 0437 98.5 °F (36.9 °C) 55 18 148/82 97 % -- None (Room air) -- SS       H&P    Chief Complaint:   Chest pain     History of Present Illness: Yusra Wiley is a 69 year old female with PMH significant for breast CA post left mastectomy, HTN who presented to the ED with chest pain that she describes as squeezing in nature.  Says it has been off and on for the last week.  But got much worse today.  Denies any lightheadedness dizziness nausea vomiting.  Some sweating.  No abdominal pain.  Not related to exertion.  Patient's pain improved in  the ED with sublingual nitroglycerin.  Chest x-ray negative.  Labs normal.  EKG without acute findings.  Troponin normal.  Cardiology was consulted and patient was admitted for further care and management.        ASSESSMENT / PLAN:   Yusra Wiley is a 69 year old female with PMH significant for breast CA post left mastectomy, HTN who presented to the ED with chest pain that she describes as squeezing in nature.       Chest pain - concerning for angina  -tele  -trend trops  -initial trop and EKG neg  -supportive care  -cardiology consulted >> apprec recs     HTN  -resume home meds: amlodipine, metoprolol, lisinopril     Hx of breast ca, sp mastectomy  -stable, not active     11/24 HOSPITALIST NOTE    Vital signs:  Temp:  [97.8 °F (36.6 °C)-98.8 °F (37.1 °C)] 98.5 °F (36.9 °C)  Pulse:  [55-61] 55  Resp:  [18] 18  BP: (127-173)/(69-91) 148/82  SpO2:  [95 %-100 %] 97 %     Diagnostic Data:    Labs:        Recent Labs   Lab 11/23/24  1409 11/23/24  1423 11/24/24  0546   WBC 8.7  --  7.1   HGB 16.5*  --  15.7   MCV 88.7  --  88.5   .0  --  183.0   INR  --  0.94  --               Recent Labs   Lab 11/23/24  1409 11/24/24  0546   * 88   BUN 17 18   CREATSERUM 1.07* 0.90   CA 9.9 9.6   ALB 4.5  --     144   K 3.9 4.0    112   CO2 28.0 24.0   ALKPHO 96  --    AST 19  --    ALT 20  --    BILT 0.6  --    TP 7.4  --           Assessment & Plan:  Yusra Wiley is a 69 year old female with PMH significant for breast CA post left mastectomy, HTN who presented to the ED with chest pain that she describes as squeezing in nature.       Chest pain - concerning for angina  -tele  -trend trops - all neg overnight  -initial trop and EKG neg  -supportive care  -cardiology consulted >> apprec recs     HTN  -resume home meds: amlodipine, metoprolol, lisinopril     Hx of breast ca, sp mastectomy  -stable, not active     11/24 CARDIOLOGY CONSULT NOTE    Reason for Consultation:  Chest pain      History of  Present Illness:  Yusra Wiley is a a(n) 69 year old female with PMH of HTN and hx of breast cancer who is coming in for chest pain.   Having chest pain described it as squeezing pain. Every morning for the past week. Her BP in the morning have been 160-180s/90s, and then once she takes her medications. Her blood pressure improves, and so does her chest pains. No associated SOB, orthopnea, PND or BLE edema.      In ED, she was hemodynamically stable. Labs were unremarkable. Troponin x3 negative. EKG non-ischemic.      This morning, she had similar chest pains while the blood pressure was high. Now she says that it is resolved.           Assessment:      #Atypical chest pain  # HTN  # Hx of breast cancer      Plan:   - Troponin x3 negative  - EKG reviewed - non-ischemic   - Increase amlodipine to 5mg - take it at night  - continue lisinopril 40mg daily and metoprolol 50mg BID  - Obtain echocardiogram for further evaluation  - obtain nuclear lexiscan stress test tomorrow AM.   - Monitor telemetry    --------------  DISCHARGE REVIEW    Payor: KYLIE MA [employer]  Subscriber #:  [id number]  Authorization Number: [id number]    Admit date: 11/23/24  Admit time:   5:55 PM  Discharge Date: 11/25/2024  3:23 PM     Admitting Physician: Kenyatta Roland MD  Attending Physician:  No att. providers found  Primary Care Physician: Nicholas Williamson MD       Discharge Summary Notes    No notes of this type exist for this encounter.         REVIEWER COMMENTS

## 2024-11-27 LAB
ATRIAL RATE: 64 BPM
P AXIS: 60 DEGREES
P-R INTERVAL: 156 MS
Q-T INTERVAL: 416 MS
QRS DURATION: 82 MS
QTC CALCULATION (BEZET): 429 MS
R AXIS: -31 DEGREES
T AXIS: 30 DEGREES
VENTRICULAR RATE: 64 BPM

## 2024-12-02 NOTE — DISCHARGE SUMMARY
[facility]OSPITAL[facility]  DISCHARGE SUMMARY     Yusra Wiley Patient Status:  Inpatient    1955 MRN [id number]   Location [facility] 8NE-A Attending No att. providers found   Hosp Day # 2 PCP Nicholas Williamson MD     Date of Admission: 2024  Date of Discharge: 2024  Discharge Disposition: Home or Self Care    Discharge Diagnosis:   Chest pain - concerning for angina  HTN  Hx of breast ca, sp mastectomy    History of Present Illness:   Yusra Wiley is a 69 year old female with PMH significant for breast CA post left mastectomy, HTN who presented to the ED with chest pain that she describes as squeezing in nature.  Says it has been off and on for the last week.  But got much worse today.  Denies any lightheadedness dizziness nausea vomiting.  Some sweating.  No abdominal pain.  Not related to exertion.  Patient's pain improved in the ED with sublingual nitroglycerin.  Chest x-ray negative.  Labs normal.  EKG without acute findings.  Troponin normal.  Cardiology was consulted and patient was admitted for further care and management.       Brief Synopsis:     Chest pain - concerning for angina  -tele  -trend trops - all neg overnight  -initial trop and EKG neg  -supportive care  -cardiology consulted >> apprec recs  -ECHO results pending  -Stress this am     HTN  -resume home meds: amlodipine, metoprolol, lisinopril     Hx of breast ca, sp mastectomy  -stable, not active     DC INSTRUCTIONS  cardiac workup neg - and cleared by cards   Pt can be discharged home today  Await stress test results and echo - all neg  Plan of care discussed with patient and all questions answered.             Lace+ Score: 56  59-90 High Risk  29-58 Medium Risk  0-28   Low Risk       TCM Follow-Up Recommendation:  LACE 29-58: Moderate Risk of readmission after discharge from the hospital.    Procedures during hospitalization:   none    Incidental or significant findings and recommendations (brief  descriptions):  none    Lab/Test results pending at Discharge:   none    Consultants:  cards    Discharge Medication List:     Discharge Medications        CHANGE how you take these medications        Instructions Prescription details   amLODIPine 5 MG Tabs  Commonly known as: Norvasc  What changed:   medication strength  how much to take  when to take this      Take 1 tablet (5 mg total) by mouth in the morning and 1 tablet (5 mg total) before bedtime.   Quantity: 90 tablet  Refills: 1            CONTINUE taking these medications        Instructions Prescription details   lisinopril 40 MG Tabs  Commonly known as: Prinivil; Zestril      TAKE 1 TABLET(40 MG) BY MOUTH DAILY   Quantity: 90 tablet  Refills: 0     metoprolol tartrate 50 MG Tabs  Commonly known as: Lopressor      TAKE 1 TABLET(50 MG) BY MOUTH TWICE DAILY   Quantity: 180 tablet  Refills: 0               Where to Get Your Medications        These medications were sent to [id number] - [place], [place] - [address] AT [facility], [phone], [phone] [address] [place] [address] [place] [address]      Phone: [phone]   amLODIPine 5 MG Tabs         [place]    Follow-up appointment:   Nicholas Williamson MD  [address] [place] [address]  [place] [address] [phone]    Schedule an appointment as soon as possible for a visit in 1 week(s)      Your [facility] Cardiologist    Follow up  Tomorrow as reported    Appointments for Next 30 Days 12/1/2024 - 12/31/2024      None            Vital signs:  Temp:  [97.7 °F (36.5 °C)-98.7 °F (37.1 °C)] 97.7 °F (36.5 °C)  Pulse:  [53-71] 59  Resp:  [18-20] 18  BP: (140-175)/(65-76) 140/76  SpO2:  [96 %-100 %] 100 %     Physical Exam:    General: No acute distress.   HEENT:  EOMI, PERRLA, OP clear  Respiratory: Clear to auscultation bilaterally. No wheezes. No rhonchi.  Cardiovascular: S1, S2. Regular rate and rhythm. No murmurs.  Abdomen: Soft, nontender, nondistended.  Positive  bowel sounds. No rebound or guarding.  Extremities: No edema.  Neuro:  Grossly non focal, no motor deficits.       -----------------------------------------------------------------------------------------------  PATIENT DISCHARGE INSTRUCTIONS: See electronic chart    Romina Heredia MD    Time spent:  > 30 minutes